# Patient Record
Sex: FEMALE | Race: OTHER
[De-identification: names, ages, dates, MRNs, and addresses within clinical notes are randomized per-mention and may not be internally consistent; named-entity substitution may affect disease eponyms.]

---

## 2017-05-03 NOTE — RAD
HISTORY: Right foot pain



COMPARISONS: None



VIEWS: 3, Frontal, lateral, and oblique views of the right foot



FINDINGS:



BONE DENSITY: Normal.

BONES: There is no displaced fracture. There is a calcaneal enthesophyte.

JOINTS: There is mild osteophytosis of the first MTP joint    

ALIGNMENT: There is no dislocation. 

SOFT TISSUES: Unremarkable.



OTHER FINDINGS: None.



IMPRESSION: 

MILD DEGENERATIVE CHANGES. NO ACUTE OSSEOUS INJURY. IF SYMPTOMS PERSIST, RECOMMEND REPEAT

IMAGING.

## 2017-05-03 NOTE — UC
Alice ACOSTA Auryana, scribed for Vamsi Faith MD on 05/03/17 at 1203 .





Lower Extremity/Ankle HPI





- HPI Summary


HPI Summary: 





51 year old females presents with right foot pain. She reports that she fell 

down this stairs at 09:30 this morning. She states that the pain radiates up 

the right calf and mildly swollen. Ambulation makes the pain worse and states 

there was no improvement with elevation. She denies any medication PTA. PMHx is 

significant for osteoporosis 








- History of Current Complaint


Chief Complaint: UCLowerExtremity


Stated Complaint: FELL-RIGHT FOOT


Time Seen by Provider: 05/03/17 11:47


Hx Obtained From: Patient


Hx Last Menstrual Period: menapause


Pregnant?: No


Onset/Duration: Sudden Onset, Lasting Hours - stating this morning at 09:30, 

Still Present


Severity Initially: Mild


Severity Currently: Mild


Aggravating Factor(s): Ambulation


Alleviating Factor(s): Nothing


Able to Bear Weight: No - limping and must have help


Related History: Other - fell down stairs





- Allergies/Home Medications


Allergies/Adverse Reactions: 


 Allergies











Allergy/AdvReac Type Severity Reaction Status Date / Time


 


Molds & Smuts Allergy Severe CAN'T Verified 07/20/16 15:07





   BREATHE,  





   HIVES  


 


Penicillins Allergy Severe Shortness Verified 07/20/16 15:07





   of Breath  


 


Aspirin Allergy Intermediate Nausea And Verified 07/20/16 15:07





   Vomiting  


 


Naproxen [From Aleve] Allergy Mild Vomiting Verified 07/20/16 15:07


 


CHIPOTLE Allergy Severe "SICK", Uncoded 07/20/16 15:07





   NAUSEA  


 


CLAMS Allergy Severe HIVES, Uncoded 07/20/16 15:07





   "MAKES ME  





   SICK"  


 


ENVIRONMENTAL Allergy Unknown Unknown Uncoded 07/20/16 15:07





   Reaction  





   Details  


 


MICE Allergy Unknown Unknown Uncoded 07/20/16 15:08





   Reaction  





   Details  














PMH/Surg Hx/FS Hx/Imm Hx


Endocrine History Of: 


   Denies: Diabetes, Thyroid Disease


Cardiovascular History Of: Reports: Cardiac Disorders - MURMUR


   Denies: Hypertension


Respiratory History Of: Reports: Asthma - uses nebulizer, Bronchitis


   Denies: COPD


GI/ History Of: 


   Denies: Ulcer


Cancer History Of: 


   Denies: Breast Cancer





- Surgical History


Surgical History: Yes


Surgery Procedure, Year, and Place: tubal ligation.  cholecystectomy





- Family History


Known Family History: Positive: Hypertension, Diabetes, Other - cervical cancer





- Social History


Occupation: Disabled


Lives: With Family


Alcohol Use: None


Substance Use Type: None


Smoking Status (MU): Heavy Every Day Tobacco Smoker


Type: Cigarettes


Amount Used/How Often: 10-12 cigs daily


Length of Time of Smoking/Using Tobacco: 20+ years


Have You Smoked in the Last Year: Yes





Review of Systems


Constitutional: Negative


Skin: Negative


Eyes: Negative


ENT: Negative


Respiratory: Negative


Cardiovascular: Negative


Gastrointestinal: Negative


Genitourinary: Negative


Motor: Negative


Neurovascular: Negative


Musculoskeletal: Edema - at the first MCP on the right foot, Other: - pain at 

the first MCP on the right foot


Neurological: Negative


Psychological: Negative


All Other Systems Reviewed And Are Negative: Yes





Physical Exam


Triage Information Reviewed: Yes


Appearance: Well-Nourished, Pain Distress - mild


Vital Signs: 


 Initial Vital Signs











Temp  98.4 F   05/03/17 11:42


 


Pulse  65   05/03/17 11:42


 


Resp  18   05/03/17 11:42


 


BP  117/71   05/03/17 11:42


 


Pulse Ox  98   05/03/17 11:42











Vital Signs Reviewed: Yes


Eyes: Positive: Conjunctiva Clear


Neck: Positive: Supple


Respiratory: Positive: Lungs clear


Cardiovascular: Positive: RRR


Bowel Sounds: Positive: Present


Musculoskeletal: Positive: Edema @ - at site of injury, Other: - tenderness at 

the right first MCP of the foot


Neurological: Positive: Alert


Psychological: Positive: Age Appropriate Behavior


Skin Exam: Normal, Other - no break in the skin





Diagnostics





- Radiology


  ** R FOOT XR


Xray Interpretation: No Acute Changes - IMPRESSION:  MILD DEGENERATIVE CHANGES. 

NO ACUTE OSSEOUS INJURY. IF SYMPTOMS PERSIST, RECOMMEND REPEAT


Radiology Interpretation Completed By: Radiologist





Lower Extremity Course/Dx





- Course


Course Of Treatment: HOME WITH CAM WALKER





- Differential Dx/Diagnosis


Provider Diagnoses: RT FOOT SPRAIN





Discharge





- Discharge Plan


Condition: Stable


Disposition: HOME


Patient Education Materials:  Foot Sprain (ED)


Referrals: 


Leela Lang MD [Primary Care Provider] - 


Additional Instructions: 


FOLLOW UP WITH YOUR DOCTOR.


GO THE EMERGENCY DEPARTMENT WITH ANY WORSENING OF YOUR CONDITION OR QUESTIONS 

OR CONCERNS.





The documentation as recorded by the Alice waldrop Auryana accurately 

reflects the service I personally performed and the decisions made by me, 

Vamsi Faith MD.

## 2017-08-05 ENCOUNTER — HOSPITAL ENCOUNTER (EMERGENCY)
Dept: HOSPITAL 25 - ED | Age: 52
Discharge: HOME | End: 2017-08-05
Payer: MEDICARE

## 2017-08-05 VITALS — SYSTOLIC BLOOD PRESSURE: 120 MMHG | DIASTOLIC BLOOD PRESSURE: 67 MMHG

## 2017-08-05 DIAGNOSIS — J45.909: ICD-10-CM

## 2017-08-05 DIAGNOSIS — Z88.0: ICD-10-CM

## 2017-08-05 DIAGNOSIS — F17.210: ICD-10-CM

## 2017-08-05 DIAGNOSIS — Z90.49: ICD-10-CM

## 2017-08-05 DIAGNOSIS — Z88.6: ICD-10-CM

## 2017-08-05 DIAGNOSIS — M19.072: Primary | ICD-10-CM

## 2017-08-05 DIAGNOSIS — M81.0: ICD-10-CM

## 2017-08-05 PROCEDURE — 96372 THER/PROPH/DIAG INJ SC/IM: CPT

## 2017-08-05 PROCEDURE — 99282 EMERGENCY DEPT VISIT SF MDM: CPT

## 2017-08-05 NOTE — RAD
Indication: LEFT ankle pain and swelling without proceeding injury. Attention medial

aspect.



Comparison: No relevant prior exams available on the [facility] PACS for comparison.



Technique: AP, mortise, and lateral views LEFT ankle.



Report: Negative for fracture or malalignment. Mild osteophytosis at the talocrural joint.

Potential 3 mm loose body at the posterior joint recess. Suggestion of small talocrural

joint effusion at the posterior recess. Small plantar fascia origin and Achilles tendon

insertion bone spurs. Mild nonfocal soft tissue swelling.



IMPRESSION:  Mild talocrural joint osteoarthritis with potential small loose body at the

posterior joint recess.

## 2017-08-05 NOTE — ED
Lower Extremity





- HPI Summary


HPI Summary: 





Patient presents with left ankle pain without injury.  She notes to swelling x 

2 weeks which dissipated yesterday, but continues with 8/10 constant pain not 

worse with palpation.  Better with rest, worse with ambulating.  Pain is 

located diffusely throughout the ankle, but worse medially and anteriorly over 

the dorsum of the foot.  She also notes to right shoulder pain x 2 days and 

this morning felt a "pop" while stretching.  She has no limit to her ROM and 

pain does not radiate.  PMHx includes arthritis and is followed by her PCP.  

Good pulses +2 bilaterally.  Good capillary refill <2 sec. Denies other 

illness. Thorough physical exam was performed, focusing on ankle special tests 

including foot exam.  Pain on palpation over medial aspect and superior aspect 

with involvement of the deltoid ligaments. No pain on palpation over lateral 

side. Anterior drawer test negative, talar tilt test negative. Vasquez test 

negative, although performed in supine position. Limited ROM. Dorsiflexion, 

great toe extension and plantar flexion intact. No pain on palpation over 

medial or lateral lower extremity.  No pain in the calf and patient denies 

recent surgery or travel. No pain with knee flexion. Pulses intact bilaterally.

  No temperature change or pallor noted bilaterally.  No ecchymosis and 

swelling noted on lateral aspect.  No lesion or disruption of skin is seen. 

Able to bear weight. Shoulder exams performed with no acute findings. Will 

defer at this time for xray and patient agrees. 





- History of Current Complaint


Chief Complaint: EDExtremityLower


Stated Complaint: LEFT ANKLE PAIN, RIGHT SHOULDER INJURY


Time Seen by Provider: 08/05/17 09:52


Hx Obtained From: Patient


Hx Last Menstrual Period: menapause


Mechanism Of Injury: Unknown


Onset of Pain: Days


Onset/Duration: Weeks


Severity Initially: Moderate


Severity Currently: Moderate


Pain Intensity: 4


Pain Scale Used: 0-10 Numeric


Timing: Constant


Location: Is Discrete @ - left ankle medially


Associated Signs And Symptoms: Positive: Negative


Aggravating Factor(s): Standing, Ambulation


Alleviating Factor(s): Rest





- Allergies/Home Medications


Allergies/Adverse Reactions: 


 Allergies











Allergy/AdvReac Type Severity Reaction Status Date / Time


 


Molds & Smuts Allergy Severe CAN'T Verified 07/20/16 15:07





   BREATHE,  





   HIVES  


 


Penicillins Allergy Severe Shortness Verified 07/20/16 15:07





   of Breath  


 


Aspirin Allergy Intermediate Nausea And Verified 07/20/16 15:07





   Vomiting  


 


Naproxen [From Aleve] Allergy Mild Vomiting Verified 07/20/16 15:07


 


CHIPOTLE Allergy Severe "SICK", Uncoded 07/20/16 15:07





   NAUSEA  


 


CLAMS Allergy Severe HIVES, Uncoded 07/20/16 15:07





   "MAKES ME  





   SICK"  


 


ENVIRONMENTAL Allergy Unknown Unknown Uncoded 07/20/16 15:07





   Reaction  





   Details  


 


MICE Allergy Unknown Unknown Uncoded 07/20/16 15:08





   Reaction  





   Details  














PMH/Surg Hx/FS Hx/Imm Hx


Previously Healthy: Yes


Endocrine/Hematology History: 


   Denies: Hx Diabetes, Hx Thyroid Disease


Cardiovascular History: 


   Denies: Hx Hypertension


Respiratory History: Reports: Hx Asthma - uses nebulizer


   Denies: Hx Chronic Obstructive Pulmonary Disease (COPD) - never been 

diagnosed


GI History: 


   Denies: Hx Ulcer


Musculoskeletal History: Reports: Hx Osteoporosis





- Cancer History


Cancer Type, Location and Year: denies


Hx Chemotherapy: No


Hx Radiation Therapy: No





- Surgical History


Surgery Procedure, Year, and Place: tubal ligation.  cholecystectomy





- Immunization History


Date of Tetanus Vaccine: unknown


Date of Influenza Vaccine: UTD


Hx Pertussis Vaccination: No


Immunizations Up to Date: Unable to Obtain/Confirm


Infectious Disease History: No


Infectious Disease History: 


   Denies: Hx Clostridium Difficile, Hx Hepatitis, Hx Human Immunodeficiency 

Virus (HIV), Hx of Known/Suspected MRSA, Hx Shingles, Hx Tuberculosis, Hx Known/

Suspected VRE, Hx Known/Suspected VRSA, History Other Infectious Disease, 

Traveled Outside the [place] in Last 30 Days





- Family History


Known Family History: Positive: Hypertension, Diabetes, Other - cervical cancer





- Social History


Occupation: Unemployed, Employed Full-time


Lives: With Family


Alcohol Use: None


Hx Substance Use: No


Substance Use Type: Reports: None


Hx Tobacco Use: Yes


Smoking Status (MU): Heavy Every Day Tobacco Smoker


Type: Cigarettes


Amount Used/How Often: 10-12 cigs daily


Length of Time of Smoking/Using Tobacco: 20+ years


Have You Smoked in the Last Year: Yes





Review of Systems


Constitutional: Negative


Eyes: Negative


Positive: Palpitations


Respiratory: Negative


Positive: no symptoms reported, see HPI


Positive: Arthralgia, Myalgia


Skin: Negative


Neurological: Negative


All Other Systems Reviewed And Are Negative: Yes





Physical Exam


Triage Information Reviewed: Yes


Vital Signs On Initial Exam: 


 Initial Vitals











Temp Pulse Resp BP Pulse Ox


 


 97.3 F   71   17   120/67   98 


 


 08/05/17 09:46  08/05/17 09:46  08/05/17 09:46  08/05/17 09:46  08/05/17 09:46











Vital Signs Reviewed: Yes


Appearance: Positive: Well-Appearing, Well-Nourished


Skin: Positive: Warm, Skin Color Reflects Adequate Perfusion


Neck: Positive: Supple, No Lymphadenopathy


Respiratory/Lung Sounds: Positive: Clear to Auscultation, Breath Sounds Present


Musculoskeletal: Positive: Normal - see HPI


Neurological: Positive: Sensory/Motor Intact, Alert, Oriented to Person Place, 

Time, Speech Normal


Psychiatric: Positive: Normal





Diagnostics





- Vital Signs


 Vital Signs











  Temp Pulse Resp BP Pulse Ox


 


 08/05/17 10:10  97.3 F  71  20  120/67  98


 


 08/05/17 09:46  97.3 F  71  17  120/67  98














- Laboratory


Lab Statement: Any lab studies that have been ordered have been reviewed, and 

results considered in the medical decision making process.





Lower Extremity Course/Dx





- Course


Course Of Treatment: Based on [place] [facility]le Rules, patient sent to imaging.  

Xray negative for fracture or other acute findings. Medial and lateral distal 

lower extremity without pain and x-rays show no widening of the ankle joint 

regarding low suspicion for Maisonneuve fx.  Ankle was ace wrapped to patient 

comfort to allow for immobilization for this period of time. Patient given 

orthopedic follow up in 5-7 days.  Encouraged Ibuprofen 600mg three times daily 

with meals for pain.  Return precautions given.  Educated patient regarding 

ankle injuries and healing time and the possibility of further evaluation and 

imaging as orthopedist sees fit.





- Diagnoses


Differential Diagnosis/HQI/PQRI: Positive: Fracture (Closed), Sprain, Strain


Provider Diagnoses: 


 Arthritis








Discharge





- Discharge Plan


Condition: Stable


Disposition: HOME


Prescriptions: 


Ketorolac TAB * [Toradol TAB *] 10 mg PO Q6H #16 tab


Patient Education Materials:  Osteoarthritis (ED)


Referrals: 


Leela Lang MD [Primary Care Provider] - 


Additional Instructions: 





Toradol x 4 days


Do not take ibuprofen with this medication


Ibuprofen 600mg three times daily with meals for pain.  


Follow up with orthopedic physician in 5-7 days. 


If numbness, tingling, decreased sensation, increased pain, temperature changes 

or pallor noted in toes, come back to ER immediately. 


Ice.  Not directly on the skin. Cover with a towel. Apply ice no more than 30 

minutes at a time 


Compression:  You may use and keep an ace wrap bandage over the injury to 

decrease swelling. Again, this should be limited and be taken off periodically 

to encourage early range of motion and mobilization.


Elevate: Try to elevate the injured area above the heart whenever possible.

## 2018-02-11 ENCOUNTER — HOSPITAL ENCOUNTER (EMERGENCY)
Dept: HOSPITAL 25 - ED | Age: 53
LOS: 1 days | Discharge: HOME | End: 2018-02-12
Payer: MEDICARE

## 2018-02-11 DIAGNOSIS — F17.210: ICD-10-CM

## 2018-02-11 DIAGNOSIS — M54.5: Primary | ICD-10-CM

## 2018-02-11 PROCEDURE — 96372 THER/PROPH/DIAG INJ SC/IM: CPT

## 2018-02-11 PROCEDURE — 99282 EMERGENCY DEPT VISIT SF MDM: CPT

## 2018-02-11 PROCEDURE — 72131 CT LUMBAR SPINE W/O DYE: CPT

## 2018-02-12 VITALS — SYSTOLIC BLOOD PRESSURE: 118 MMHG | DIASTOLIC BLOOD PRESSURE: 60 MMHG

## 2018-02-12 NOTE — RAD
HISTORY: Right-sided back pain, right flank pain



COMPARISONS: None



TECHNIQUE: Multiple contiguous axial CT scans were obtained of the lumbar spine without 

intravenous contrast, with coronal and sagittal multiplanar reformations.     



FINDINGS:



SPINAL CANAL: Evaluation of the central canal is limited on CT technique; however, there

is no obvious canalicular mass or epidural hemorrhage.

ALIGNMENT: There is a mild levoscoliotic curvature of the spine.

VERTEBRAL BODIES: The vertebral bodies are preserved in height. The bones are normal in

attenuation.

JOINTS: There is mild facet hypertrophic change along the lower lumbar spine

MUSCULATURE: Normal

INTERVERTEBRAL DISCS: There is mild diffuse loss of intervertebral disc height throughout

the spine.



AXIAL IMAGES:

T12-L1: There is no osseous neural foraminal narrowing or central canal stenosis.

L1-L2: There is no osseous neural foraminal narrowing or central canal stenosis.

L2-L3: There is no osseous neural foraminal narrowing or central canal stenosis.

L3-L4: There is a mild broad-based disc bulge. There is no osseous neural foraminal

narrowing or central canal stenosis.

L4-L5: There is a broad-based disc bulge. There is no osseous neural foraminal narrowing

or central canal stenosis.

L5-S1: There is a mild broad-based disc bulge. There is no osseous neural foraminal

narrowing or central canal stenosis.



SOFT TISSUES: There is mild calcific atherosclerosis of the abdominal aorta.



OTHER: None



IMPRESSION:

MILD DEGENERATIVE DISC DISEASE AND OSTEOARTHRITIS, WITHOUT OSSEOUS NEURAL FORAMINAL AREA

OR CENTRAL CANAL STENOSIS.

## 2018-05-24 ENCOUNTER — HOSPITAL ENCOUNTER (EMERGENCY)
Dept: HOSPITAL 25 - UCEAST | Age: 53
Discharge: HOME | End: 2018-05-24
Payer: MEDICARE

## 2018-05-24 VITALS — SYSTOLIC BLOOD PRESSURE: 127 MMHG | DIASTOLIC BLOOD PRESSURE: 59 MMHG

## 2018-05-24 DIAGNOSIS — Z88.0: ICD-10-CM

## 2018-05-24 DIAGNOSIS — S99.921A: Primary | ICD-10-CM

## 2018-05-24 DIAGNOSIS — Z91.048: ICD-10-CM

## 2018-05-24 DIAGNOSIS — F17.210: ICD-10-CM
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
## 2018-05-24 DIAGNOSIS — Z88.6: ICD-10-CM

## 2018-05-24 PROCEDURE — G0463 HOSPITAL OUTPT CLINIC VISIT: HCPCS

## 2018-05-24 PROCEDURE — 99212 OFFICE O/P EST SF 10 MIN: CPT

## 2018-05-24 NOTE — RAD
HISTORY: Right foot great toe pain, trauma



COMPARISONS: May 03, 2017



VIEWS: 6, Frontal, lateral, and oblique views of the right foot and great toe



FINDINGS:



BONE DENSITY: Normal.

BONES: There is no displaced fracture. There are calcaneal enthesophytes.

JOINTS: There is mild osteoarthritis of the first MTP joint.    

ALIGNMENT: There is no dislocation. 

SOFT TISSUES: Unremarkable.



OTHER FINDINGS: None.



IMPRESSION: 

MILD OSTEOARTHRITIS. NO ACUTE OSSEOUS INJURY. IF SYMPTOMS PERSIST, RECOMMEND REPEAT

IMAGING.

## 2018-05-24 NOTE — UC
Lower Extremity/Ankle HPI





- HPI Summary


HPI Summary: 





53 yo WF c/o right great toe and foot pain after dropping a VCR player on her 

right foot yesterday and now swollen and PAINFUL





- History of Current Complaint


Chief Complaint: UCLowerExtremity


Stated Complaint: FOOT INJURY


Time Seen by Provider: 05/24/18 07:49


Hx Obtained From: Patient


Hx Last Menstrual Period: menapause


Severity Initially: Moderate


Severity Currently: Moderate


Pain Intensity: 4


Pain Scale Used: 0-10 Numeric


Aggravating Factor(s): Standing


Able to Bear Weight: Yes





- Allergies/Home Medications


Allergies/Adverse Reactions: 


 Allergies











Allergy/AdvReac Type Severity Reaction Status Date / Time


 


clams Allergy  Hives Verified 05/24/18 07:22


 


mold Allergy  Anaphylatic Verified 05/24/18 07:22





   Shock  


 


Penicillins Allergy  Shortness Verified 05/24/18 07:22





   of Breath  


 


NSAIDS (Non-Steroidal AdvReac  Nausea And Verified 05/24/18 07:22





Anti-Inflamma   Vomiting  


 


ENVIRONMENTAL Allergy Unknown Unknown Uncoded 05/24/18 07:22





   Reaction  





   Details  


 


MICE Allergy Unknown Unknown Uncoded 05/24/18 07:22





   Reaction  





   Details  


 


CHIPOTLE AdvReac Severe "SICK", Uncoded 05/24/18 07:22





   NAUSEA  














PMH/Surg Hx/FS Hx/Imm Hx


Previously Healthy: Yes





- Surgical History


Surgical History: None


Surgery Procedure, Year, and Place: tubal ligation.  cholecystectomy





- Family History


Known Family History: Positive: Hypertension, Diabetes, Other - cervical cancer





- Social History


Alcohol Use: None


Substance Use Type: None


Smoking Status (MU): Heavy Every Day Tobacco Smoker


Type: Cigarettes


Amount Used/How Often: 7 cigs daily


Length of Time of Smoking/Using Tobacco: 20+ years


Have You Smoked in the Last Year: Yes





Review of Systems


Constitutional: Negative


Skin: Negative


Eyes: Negative


ENT: Negative


Respiratory: Negative


Cardiovascular: Negative


Gastrointestinal: Negative


Genitourinary: Negative


Motor: Negative


Neurovascular: Negative


Musculoskeletal: Other: - right swollen foot


Neurological: Negative


Psychological: Negative


All Other Systems Reviewed And Are Negative: Yes





Physical Exam


Triage Information Reviewed: Yes


Appearance: Well-Appearing


Vital Signs: 


 Initial Vital Signs











Temp  36.5 C   05/24/18 07:14


 


Pulse  73   05/24/18 07:14


 


Resp  14   05/24/18 07:14


 


BP  127/59   05/24/18 07:14


 


Pulse Ox  99   05/24/18 07:14











Eye Exam: Normal


ENT Exam: Normal


Dental Exam: Normal


Neck exam: Normal


Neck: Positive: 1


Respiratory Exam: Normal


Cardiovascular Exam: Normal


Abdominal Exam: Normal


Musculoskeletal: Positive: Other: - right 1st toe MTPJ tenderness and swelling


Neurological Exam: Normal


Psychological Exam: Normal


Skin Exam: Normal





Lower Extremity Course/Dx





- Course


Course Of Treatment: XR of right great toe and foot neg for fx or dislocation





- Differential Dx/Diagnosis


Provider Diagnoses: right foot injury.  right 1st toe metatarsal injury and pain





Discharge





- Sign-Out/Discharge


Documenting (check all that apply): Discharge/Admit/Transfer





- Discharge Plan


Condition: Stable


Disposition: HOME


Patient Education Materials:  Swollen Joint (ED)


Referrals: 


Leela Lang MD [Primary Care Provider] - 





- Billing Disposition and Condition


Condition: STABLE


Disposition: HOME

## 2018-08-09 ENCOUNTER — HOSPITAL ENCOUNTER (EMERGENCY)
Dept: HOSPITAL 25 - UCEAST | Age: 53
Discharge: HOME | End: 2018-08-09
Payer: MEDICARE

## 2018-08-09 VITALS — DIASTOLIC BLOOD PRESSURE: 68 MMHG | SYSTOLIC BLOOD PRESSURE: 111 MMHG

## 2018-08-09 DIAGNOSIS — M25.511: Primary | ICD-10-CM

## 2018-08-09 DIAGNOSIS — F17.210: ICD-10-CM

## 2018-08-09 DIAGNOSIS — Z88.0: ICD-10-CM

## 2018-08-09 DIAGNOSIS — Z88.6: ICD-10-CM

## 2018-08-09 PROCEDURE — G0463 HOSPITAL OUTPT CLINIC VISIT: HCPCS

## 2018-08-09 PROCEDURE — 99213 OFFICE O/P EST LOW 20 MIN: CPT

## 2018-08-09 NOTE — XMS REPORT
Jelena Fitch

 Created on:2018



Patient:Jelena Fitch

Sex:Female

:1965

External Reference #:[id number]





Demographics







 Address  [address]



   [place] [address]

 

 Home Phone  [phone]

 

 Mobile Phone  [phone]

 

 Email Address  [email or web address]

 

 Preferred Language  English

 

 Marital Status   Or 

 

 Rastafarian Affiliation  Unknown

 

 Race  White

 

 Ethnic Group   Or 









Author







 Organization  [facility]

 

 Address  [address]



   [place] [address]

 

 Phone  [phone]









Care Team Providers







 Name  Role  Phone

 

 Leela Lang MD  Care Team Information   Unavailable

 

 Leela Lang MD  Primary Care Physician  Unavailable









Payers







 Type  Date  Identification Numbers  Payment Provider  Subscriber

 

 Medicare Primary  Effective:  Policy Number:  [place]  Jelena Fitch



   2017  [id number]    









 PayID: [id number]  [address]









 [facility] [place] [address]









 Medigap Part B    Policy Number: [id number]  [place]icaid [place]  Jelena Fitch









 PayID: [id number]  [address]









 [place] [address]









 Health Maintenance  Expires:  Policy Number:  [facility] ([facility])  2017  [id number]  [place]









 PayID: [id number]  [address] [address]









 [place] [address]







Problems







 Date  Description  Provider  Status

 

 Onset: 2017  Allergic rhinitis due to pollen  Manuel Padilla MD  
Active

 

 Onset: 2017  Allergic rhinitis  Manule Padilla MD  Active

 

 Onset: 2017  Uncomplicated moderate persistent  Manuel Padilla MD
  Active



   asthma    

 

 Onset: 2018  Asthma without status asthmaticus  LIDIA Tijerina  Active







Family History







 Date  Family Member(s)  Problem(s)  Comments

 

   General  No Current Problems  







Social History







 Type  Date  Description  Comments

 

 Smoking    Patient has never smoked  







Allergies, Adverse Reactions, Alerts







 Date  Description  Reaction  Status  Severity  Comments

 

 06/10/2015  Cats    active    

 

 06/10/2015  Dust Mite    active    

 

 06/10/2015  House Dust    active    

 

 06/10/2015  Dogs    active    







Medications







 Medication  Date  Status  Form  Strength  Qnty  SIG  Indications  Ordering



                 Provider

 

 Flonase    Active  Suspension  27.5mcg/S  15.80  one spray  J30.89  
Christopher



 Sensimist        pray  0ml  in each    GEE Padilla MD



             nostril    



             daily    

 

 Advair Diskus    Active  Aerosol  500-50mcg  60uni  Inhale One  J30.1  
Demetrio Godinez,



         /Dose  ts  puff By    PA



             Mouth Twice    



             A Day    

 

 Fexofenadine  05/10  Active  Tablets  180mg  30tab  take one    opher



 HCL          s  tablet by    GEE Padilla MD



             mouth every    



             day    

 

 Allergy 24-HR    Active  Tablets  180mg  30tab  1 by mouth            s  every day    GEE Padilla MD

 

 Proventil HFA    Active  Aerosol  108(90Bas  6.700  Inhale Two          e)  gm  Puffs Every    RICK Sotomayor



         mcg/Act    4 Hours as    



             Needed    

 

 Albuterol    Active  Nebulizer  (2.5mg/3M    inhale 3    Unknown



 Sulfate  /0000      L) 0.083%    milliliters    



             (2.5 mg) by    



             nebulizatio    



             n route 4    



             times per    



             day as    



             needed    

 

 Calcium 600-D  00  Active  Tablets  600-400mg    1 by mouth    Unknown



   /0000      -Unit    twice a day    

 

                 

 

 Prednisone  06/15  Hx  Tablets  10mg  18tab  take 3            s  tablets by    GEE Padilla MD



   -          mouth twice    



             a day for 3    



   /2018          days. take    



             with food.    

 

 Dulera    Hx  Aerosol  200-5mcg/  8.800  2 puff  J30.1        Act  gm  twice a day    GEE Padilla MD



   -              



                 

 

 Advair Diskus    Hx  Aerosol  250-50mcg  60uni  Inhale One          /Dose  ts  puff By    RICK Sotomayor



   -          Mouth Twice    



             A Day In    



             The Morning    



             And Evening    



             12 Hours    



             Apart    

 

 Zyrtec  06/10  Hx  Tablet  10mg  30tab  Take One            s  Tablet By    RICK Sotomayor



   -          Mouth Once    



             Daily as    



   /2017          Needed    







Medications Administered in Office







 Medication  Date  Status  Form  Strength  Qnty  SIG  Indications  Ordering



                 Provider

 

 Allergy  /  Administered  Injection          Christopher



 Injection 2                GEE Padilla MD



 Or More                

 

 Allergy  /  Administered  Injection          Christopher



 Injection  2018              GEE Padilla MD



 Single                

 

 Allergy  /  Administered  Injection          Christopher



 Injection 2                GEE Padilla MD



 Or More                

 

 Allergy  /15/  Administered  Injection          Christopher



 Injection 2                GEE Padilla MD



 Or More                

 

 Allergy  //  Administered  Injection          Christopher



 Injection 2                GEE Padilla MD



 Or More                

 

 Allergy  /  Administered  Injection          Christopher



 Injection 2                GEE Padilla MD



 Or More                

 

 Allergy  05/04/  Administered  Injection          Christopher



 Injection 2                GEE Padilla MD



 Or More                

 

                 

 

 Allergy  /  Administered  Injection          Christopher



 Injection 2                GEE Padilla MD



 Or More                

 

 Allergy  04//  Administered  Injection          Christopher



 Injection 2                GEE Padilla MD



 Or More                

 

 Allergy  /  Administered  Injection          Christopher



 Injection 2                GEE Padilla MD



 Or More                

 

 Allergy  /  Administered  Injection          Christopher



 Injection 2                GEE Padilla MD



 Or More                

 

 Allergy  /  Administered  Injection          Christopher



 Injection 2                GEE Padilla MD



 Or More                

 

 Allergy  //  Administered  Injection          Christopher



 Injection 2                GEE Padilla MD



 Or More                

 

 Allergy  /  Administered  Injection          Christopher



 Injection 2                GEE Padilla MD



 Or More                

 

 Allergy  10/25/  Administered  Injection          Christopher



 Injection 2                GEE Padilla MD



 Or More                

 

 Allergy  10//  Administered  Injection          Christopher



 Injection 2                GEE Padilla MD



 Or More                

 

 Allergy  /  Administered  Injection          Christopher



 Injection 2  2017              GEE Padilla MD



 Or More                

 

 Allergy  //  Administered  Injection          Christopher



 Injection 2                GEE Padilla MD



 Or More                

 

 Allergy  /  Administered  Injection          Christopher



 Injection 2                GEE Padilla MD



 Or More                

 

 Allergy  /16/  Administered  Injection          Christopher



 Injection 2                GEE Padilla MD



 Or More                

 

 Allergy  08/02/  Administered  Injection          Christopher



 Injection 2                GEE Padilla MD



 Or More                

 

 Allergy  /  Administered  Injection          Christopher



 Injection 2                GEE Padilla MD



 Or More                

 

 Allergy  //  Administered  Injection          Christopher



 Injection 2                GEE Padilla MD



 Or More                

 

 Allergy  //  Administered  Injection          Christopher



 Injection 2                GEE Padilla MD



 Or More                

 

 Allergy  //  Administered  Injection          Christopher



 Injection 2                GEE Padilla MD



 Or More                

 

 Allergy  //  Administered  Injection          Christopher



 Injection 2                GEE Padilla MD



 Or More                

 

 Allergy  /  Administered  Injection          Christopher



 Injection 2                GEE Padilla MD



 Or More                

 

 Allergy  04/03/  Administered  Injection          Christopher



 Injection 2                GEE Padilla MD



 Or More                

 

 Allergy  /  Administered  Injection          Christopher



 Injection 2                GEE Padilla MD



 Or More                

 

 Allergy  //  Administered  Injection          Christopher



 Injection 2                GEE Padilla MD



 Or More                

 

 Allergy  /  Administered  Injection          Christopher



 Injection 2                GEE Padilla MD



 Or More                

 

 Allergy  //  Administered  Injection          Christopher



 Injection 2                GEE Padilla MD



 Or More                

 

 Allergy  /  Administered  Injection          Christopher



 Injection 2                GEE Padilla MD



 Or More                

 

 Allergy  //  Administered  Injection          Christopher



 Injection 2                GEE Padilla MD



 Or More                

 

 Allergy  /  Administered  Injection          Christopher



 Injection 2                GEE Padilla MD



 Or More                

 

 Allergy  /  Administered  Injection          Christopher



 Injection 2                GEE Padilla MD



 Or More                

 

 Allergy  /  Administered  Injection          Christopher



 Injection 2                GEE Padilla MD



 Or More                

 

 Allergy  /  Administered  Injection          Christopher



 Injection 2                GEE Padilla MD



 Or More                

 

 Allergy  10/28/  Administered  Injection          Christopher



 Injection 2                GEE Padilla MD



 Or More                

 

 Allergy  10/14/  Administered  Injection          Christopher



 Injection 2                GEE Padilla MD



 Or More                

 

 Allergy  /  Administered  Injection          Christopher



 Injection 2                GEE Padilla MD



 Or More                

 

 Allergy  /  Administered  Injection          Christopher



 Injection 2                GEE Padilla MD



 Or More                

 

 Allergy  //  Administered  Injection          Christopher



 Injection 2                GEE Padilla MD



 Or More                

 

 Allergy  08//  Administered  Injection          Christopher



 Injection 2                GEE Padilla MD



 Or More                

 

 Allergy  /  Administered  Injection          Christopher



 Injection 2                GEE Padilla MD



 Or More                

 

 Allergy  07//  Administered  Injection          Christopher



 Injection 2                GEE Padilla MD



 Or More                

 

 Allergy  //  Administered  Injection          Christopher



 Injection 2                GEE Padilla MD



 Or More                

 

 Allergy  06//  Administered  Injection          Christopher



 Injection 2                GEE Padilla MD



 Or More                

 

 Allergy  /25/  Administered  Injection          Christopher



 Injection 2                GEE Padilla MD



 Or More                

 

 Allergy  /18/  Administered  Injection          Christopher



 Injection 2                GEE Padilla MD



 Or More                

 

 Allergy  /18/  Administered  Injection          Christopher



 Injection 2                GEE Padilla MD



 Or More                

 

 Allergy  //  Administered  Injection          Christopher



 Injection 2                GEE Padilla MD



 Or More                

 

 Allergy  /  Administered  Injection          Christopher



 Injection 2                GEE Padilla MD



 Or More                

 

 Allergy  /  Administered  Injection          Christopher



 Injection 2                GEE Padilla MD



 Or More                

 

 Allergy  /  Administered  Injection          Christopher



 Injection 2                GEE Padilla MD



 Or More                

 

 Allergy  /  Administered  Injection          Christopher



 Injection 2                GEE Padilla MD



 Or More                

 

 Allergy  /  Administered  Injection          Christopher



 Injection 2                GEE Padilla MD



 Or More                

 

 Allergy  /  Administered  Injection          Christopher



 Injection 2                GEE Padilla MD



 Or More                

 

 Allergy  /  Administered  Injection          Christopher



 Injection 2                GEE Padilla MD



 Or More                

 

 Allergy  02/15/  Administered  Injection          Christopher



 Injection 2                GEE Padilla MD



 Or More                

 

 Allergy  /  Administered  Injection          Christopher



 Injection 2                GEE Padilla MD



 Or More                

 

 Allergy  /  Administered  Injection          Christopher



 Injection 2                GEE Padilla MD



 Or More                

 

 Allergy  /  Administered  Injection          Christopher



 Injection 2                GEE Padilla MD



 Or More                

 

 Allergy  /  Administered  Injection          Christopher



 Injection 2                GEE Padilla MD



 Or More                

 

 Allergy  /  Administered  Injection          Christopher



 Injection 2                GEE Padilla MD



 Or More                

 

 Allergy  /  Administered  Injection          Christopher



 Injection 2                GEE Padilla MD



 Or More                

 

 Allergy  /  Administered  Injection          Christopher



 Injection 2                GEE Padilla MD



 Or More                

 

 Allergy  /  Administered  Injection          Christopher



 Injection 2                GEE Padilla MD



 Or More                

 

 Allergy  /  Administered  Injection          Christopher



 Injection 2                GEE Padilla MD



 Or More                

 

 Allergy  /  Administered  Injection          Christopher



 Injection 2                GEE Padilla MD



 Or More                

 

 Allergy  /  Administered  Injection          Christopher



 Injection 2                GEE Padilla MD



 Or More                

 

 Allergy  /  Administered  Injection          Christopher



 Injection 2                GEE Padilla MD



 Or More                







Vital Signs







 Date  Vital  Result  Comment

 

 2018  BP Systolic  122 mmHg  









 BP Diastolic  64 mmHg  

 

 Height  61 inches  5'1"

 

 Weight  177.00 lb  

 

 BMI (Body Mass Index)  33.4 kg/m2  

 

 Heart Rate  68 /min  

 

 Respiratory Rate  18 /min  

 

 Body Temperature  97.6 F  

 

 O2 % BldC Oximetry  96 %  









 2017  BP Systolic  132 mmHg  









 BP Diastolic  76 mmHg  

 

 Height  61 inches  5'1"

 

 Weight  190.00 lb  

 

 BMI (Body Mass Index)  35.9 kg/m2  

 

 Heart Rate  83 /min  

 

 Respiratory Rate  16 /min  

 

 Body Temperature  94.5 F  taken x 2

 

 O2 % BldC Oximetry  98 %  









 06/10/2015  BP Systolic  117 mmHg  









 BP Diastolic  77 mmHg  

 

 Height  61 inches  

 

 Weight  175.00 lb  

 

 Heart Rate  82 /min  







Results







 Test  Date  Test  Result  H/L  Range  Note

 

 Order  2018  Nitric Oxide  <pending>      









 PFT Supplies  <pending>      

 

 PFT With Bronchodilator  <pending>      







Procedures







 Date  CPT Code  Description  Status

 

 2018  86960  Allergy Injection 2 Or More  Completed

 

 2018  95835  Nitric Oxide  Gas Determination  Completed

 

 2018  20399  Bronchodilation Responsiveness Spirometry Pre/Post  
Completed



     Bronchodil Adm  

 

 2018  96594  Allergy Antigens Single Or Multiple  Completed

 

 2018  05046  Allergy Injection Single  Completed

 

 2018  27575  Allergy Injection 2 Or More  Completed

 

 06/15/2018  65873  Allergy Injection 2 Or More  Completed

 

 2018  15521  Allergy Injection 2 Or More  Completed

 

 2018  31350  Allergy Injection 2 Or More  Completed

 

 2018  53322  Allergy Injection 2 Or More  Completed

 

 2018  24810  Allergy Injection 2 Or More  Completed

 

 2018  69317  Allergy Injection 2 Or More  Completed

 

 2018  38560  Allergy Injection 2 Or More  Completed

 

 2018  12478  Allergy Injection 2 Or More  Completed

 

 2018  55376  Allergy Injection 2 Or More  Completed

 

 2017  03739  Allergy Injection 2 Or More  Completed

 

 2017  42586  Allergy Antigens Single Or Multiple  Completed

 

 2017  37794  Allergy Injection 2 Or More  Completed

 

 10/25/2017  25968  Allergy Injection 2 Or More  Completed

 

 10/13/2017  88304  Allergy Injection 2 Or More  Completed

 

 2017  95153  Allergy Injection 2 Or More  Completed

 

 2017  48637  Allergy Injection 2 Or More  Completed

 

 2017  30362  Allergy Injection 2 Or More  Completed

 

 2017  83018  Allergy Injection 2 Or More  Completed

 

 2017  71353  Allergy Injection 2 Or More  Completed

 

 2017  33912  Allergy Injection 2 Or More  Completed

 

 2017  78790  Allergy Injection 2 Or More  Completed

 

 2017  82229  Allergy Antigens Single Or Multiple  Completed

 

 2017  86484  Allergy Injection 2 Or More  Completed

 

 2017  35277  Allergy Injection 2 Or More  Completed

 

 2017  84110  Allergy Injection 2 Or More  Completed

 

 2017  71190  Allergy Injection 2 Or More  Completed

 

 2017  37948  Allergy Injection 2 Or More  Completed

 

 2017  63093  Allergy Injection 2 Or More  Completed

 

 2017  21487  Allergy Injection 2 Or More  Completed

 

 2017  06191  Nitric Oxide  Gas Determination  Completed

 

 2017  08466  Bronchodilation Responsiveness Spirometry Pre/Post  
Completed



     Bronchodil Adm  

 

 2017  13783  Allergy Injection 2 Or More  Completed

 

 2017  35265  Allergy Injection 2 Or More  Completed

 

 2017  35321  Allergy Injection 2 Or More  Completed

 

 2017  98223  Allergy Injection 2 Or More  Completed

 

 2016  11419  Allergy Antigens Single Or Multiple  Completed

 

 2016  77933  Allergy Injection 2 Or More  Completed

 

 2016  07194  Allergy Injection 2 Or More  Completed

 

 2016  94768  Allergy Injection 2 Or More  Completed

 

 2016  77233  Allergy Injection 2 Or More  Completed

 

 10/28/2016  58520  Allergy Injection 2 Or More  Completed

 

 10/14/2016  30486  Allergy Injection 2 Or More  Completed

 

 2016  42410  Allergy Injection 2 Or More  Completed

 

 2016  09433  Allergy Injection 2 Or More  Completed

 

 2016  59853  Allergy Injection 2 Or More  Completed

 

 2016  36291  Allergy Injection 2 Or More  Completed

 

 2016  12293  Allergy Injection 2 Or More  Completed

 

 2016  64965  Allergy Injection 2 Or More  Completed

 

 2016  70393  Allergy Antigens Single Or Multiple  Completed

 

 2016  48374  Allergy Injection 2 Or More  Completed

 

 2016  11002  Allergy Injection 2 Or More  Completed

 

 2016  26793  Allergy Injection 2 Or More  Completed

 

 2016  43218  Allergy Injection 2 Or More  Completed

 

 2016  75170  Allergy Injection 2 Or More  Completed

 

 2016  68224  Allergy Injection 2 Or More  Completed

 

 2016  67203  Allergy Injection 2 Or More  Completed

 

 2016  35653  Allergy Injection 2 Or More  Completed

 

 2016  04462  Allergy Injection 2 Or More  Completed

 

 2016  94765  Allergy Injection 2 Or More  Completed

 

 2016  10619  Allergy Injection 2 Or More  Completed

 

 2016  11612  Allergy Injection 2 Or More  Completed

 

 2016  64189  Allergy Injection 2 Or More  Completed

 

 02/15/2016  20960  Allergy Injection 2 Or More  Completed

 

 2016  34587  Allergy Injection 2 Or More  Completed

 

 2016  89361  Allergy Injection 2 Or More  Completed

 

 2016  25633  Allergy Injection 2 Or More  Completed

 

 2016  05920  Allergy Injection 2 Or More  Completed

 

 2015  70844  Allergy Injection 2 Or More  Completed

 

 2015  43038  Allergy Injection 2 Or More  Completed

 

 2015  51016  Allergy Injection 2 Or More  Completed

 

 2015  31536  Allergy Injection 2 Or More  Completed

 

 2015  41950  Allergy Injection 2 Or More  Completed

 

 2015  31812  Allergy Injection 2 Or More  Completed

 

 2015  28657  Allergy Injection 2 Or More  Completed

 

 2015  48183  Allergy Injection 2 Or More  Completed







Encounters







 Type  Date  Location  Provider  CPT E/M  Dx

 

 Office Visit  2018  9:00a  LIDIA Noble  22221  J30.89









 J30.1

 

 J45.909









 Office Visit  2017  9:45a  Suhas Padilla MD  95461  J30.1









 J30.89

 

 J45.40







Plan of Care

Future Appointment(s):08/10/2018  8:45 am - Injection 1 at [id number]  8:
30 am - LIDIA Tijerina at [facility]

## 2018-08-09 NOTE — XMS REPORT
Jelena Fitch

 Created on:2018



Patient:Jelena Fitch

Sex:Female

:1965

External Reference #:[id number]





Demographics







 Address  [address]



   [place] [address]

 

 Home Phone  [phone]

 

 Mobile Phone  [phone]

 

 Email Address  [email or web address]

 

 Preferred Language  English

 

 Marital Status   Or 

 

 Pentecostal Affiliation  Unknown

 

 Race  White

 

 Ethnic Group   Or 









Author







 Organization  [facility]

 

 Address  [address]



   [place] [address]

 

 Phone  [phone]









Care Team Providers







 Name  Role  Phone

 

 Leela Lang MD  Care Team Information   Unavailable

 

 Leela Lang MD  Primary Care Physician  Unavailable









Payers







 Type  Date  Identification Numbers  Payment Provider  Subscriber

 

 Medicare Primary  Effective:  Policy Number:  [place]  Jelena Fitch



   2017  [id number]    









 PayID: [id number]  [address]









 [facility] [place] [address]









 Medigap Part B    Policy Number: [id number]  [place]icaid [place]  Jelena Fitch









 PayID: [id number]  [address]









 [place] [address]









 Health Maintenance  Expires:  Policy Number:  [facility] (O[facility]  2017  [id number]  [place]









 PayID: [id number]  [address] [address]









 [place] [address]







Problems







 Date  Description  Provider  Status

 

 Onset: 2017  Allergic rhinitis due to pollen  Manuel Padilla MD  
Active

 

 Onset: 2017  Allergic rhinitis  Manuel Padilla MD  Active

 

 Onset: 2017  Uncomplicated moderate persistent  Manuel Padilla MD
  Active



   asthma    







Family History







 Date  Family Member(s)  Problem(s)  Comments

 

   General  No Current Problems  







Social History







 Type  Date  Description  Comments

 

 Smoking    Patient has never smoked  







Allergies, Adverse Reactions, Alerts







 Date  Description  Reaction  Status  Severity  Comments

 

 06/10/2015  Cats    active    

 

 06/10/2015  Dust Mite    active    

 

 06/10/2015  House Dust    active    

 

 06/10/2015  Dogs    active    







Medications







 Medication  Date  Status  Form  Strength  Qnty  SIG  Indications  Ordering



                 Provider

 

 Advair Diskus    Active  Aerosol  500-50mcg  60uni  Inhale One  J30.1  
Demetrio Godinez,



         /Dose  ts  puff By    PA



             Mouth Twice    



             A Day    

 

 Fexofenadine  05/10  Active  Tablets  180mg  30tab  Take One    Christopher



 HCL  /2017        s  Tablet By    GEE Padilla MD



             Mouth Every    



             Day    

 

 Allergy 24-HR    Active  Tablets  180mg  30tab  1 by mouth            s  every day    GEE Padilla MD

 

 Proventil HFA    Active  Aerosol  108(90Bas  6.700  Inhale Two          e)  gm  Puffs Every    RICK Sotomayor



         mcg/Act    4 Hours as    



             Needed    

 

 Albuterol  00  Active  Nebulizer  (2.5mg/3M    inhale 3    Unknown



 Sulfate  /0000      L) 0.083%    milliliters    



             (2.5 mg) by    



             nebulization    



             route 4    



             times per    



             day as    



             needed    

 

 Calcium 600-D    Active  Tablets  600-400mg    1 by mouth    Unknown



   /0000      -Unit    twice a day    

 

                 

 

 Prednisone  06/15  Hx  Tablets  10mg  18tab  take 3            s  tablets by    GEE Padilla MD



   -          mouth twice    



             a day for 3    



   /2018          days. take    



             with food.    

 

 Dulera    Hx  Aerosol  200-5mcg/  8.800  2 puff twice  J30.1        Act  gm  a day    GEE Padilla MD



   -              



                 

 

 Advair Diskus    Hx  Aerosol  250-50mcg  60uni  Inhale One          /Dose  ts  puff By    RICK Sotomayor



   -          Mouth Twice    



             A Day In The    



             Morning And    



             Evening 12    



             Hours Apart    

 

 Zyrtec  06/10  Hx  Tablet  10mg  30tab  Take One            s  Tablet By    RICK Sotomayor



   -          Mouth Once    



             Daily as    



   /2017          Needed    







Medications Administered in Office







 Medication  Date  Status  Form  Strength  Qnty  SIG  Indications  Ordering



                 Provider

 

 Allergy  /  Administered  Injection          Christopher



 Injection 2  2018              GEE Padilla MD



 Or More                

 

 Allergy  /  Administered  Injection          Christopher



 Injection  2018              GEE Padilla MD



 Single                

 

 Allergy  /  Administered  Injection          Christopher



 Injection 2  2018              GEE Padilla MD



 Or More                

 

 Allergy  06/15/  Administered  Injection          Christopher



 Injection 2  2018              GEE Padilla MD



 Or More                

 

 Allergy  /  Administered  Injection          Christopher



 Injection 2  2018              GEE Padilla MD



 Or More                

 

 Allergy  05/18/  Administered  Injection          Christopher



 Injection 2                GEE Padilla MD



 Or More                

 

                 

 

 Allergy  05/04/  Administered  Injection          Christopher



 Injection 2                GEE Padilla MD



 Or More                

 

 Allergy  /  Administered  Injection          Christopher



 Injection 2                GEE Padilla MD



 Or More                

 

 Allergy  //  Administered  Injection          Christopher



 Injection 2                GEE Padilla MD



 Or More                

 

 Allergy  /  Administered  Injection          Christopher



 Injection 2                GEE Padilla MD



 Or More                

 

 Allergy  /  Administered  Injection          Christopher



 Injection 2                GEE Padilla MD



 Or More                

 

 Allergy  //  Administered  Injection          Christopher



 Injection 2                GEE Padilla MD



 Or More                

 

 Allergy  /  Administered  Injection          Christopher



 Injection 2                GEE Padilla MD



 Or More                

 

 Allergy  /  Administered  Injection          Christopher



 Injection 2                GEE Padilla MD



 Or More                

 

 Allergy  10/25/  Administered  Injection          Christopher



 Injection 2                GEE Padilla MD



 Or More                

 

 Allergy  10/13/  Administered  Injection          Christopher



 Injection 2                GEE Padilla MD



 Or More                

 

 Allergy  /  Administered  Injection          Christopher



 Injection 2                GEE Padilla MD



 Or More                

 

 Allergy  //  Administered  Injection          Christopher



 Injection 2                GEE Padilla MD



 Or More                

 

 Allergy  /  Administered  Injection          Christopher



 Injection 2                GEE Padilla MD



 Or More                

 

 Allergy  //  Administered  Injection          Christopher



 Injection 2                GEE Padilla MD



 Or More                

 

 Allergy  08//  Administered  Injection          Christopher



 Injection 2                GEE Padilla MD



 Or More                

 

 Allergy  /  Administered  Injection          Christopher



 Injection 2                GEE Padilla MD



 Or More                

 

 Allergy  //  Administered  Injection          Christopher



 Injection 2                GEE Padilla MD



 Or More                

 

 Allergy  /  Administered  Injection          Christopher



 Injection 2                GEE Padilla MD



 Or More                

 

 Allergy  //  Administered  Injection          Christopher



 Injection 2                GEE Padilla MD



 Or More                

 

 Allergy  //  Administered  Injection          Christopher



 Injection 2                GEE Padilla MD



 Or More                

 

 Allergy  /  Administered  Injection          Christopher



 Injection 2                GEE Padilla MD



 Or More                

 

 Allergy  04//  Administered  Injection          Christopher



 Injection 2                GEE Padilla MD



 Or More                

 

 Allergy  /  Administered  Injection          Christopher



 Injection 2                GEE Padilla MD



 Or More                

 

 Allergy  //  Administered  Injection          Christopher



 Injection 2                GEE Padilla MD



 Or More                

 

 Allergy  /  Administered  Injection          Christopher



 Injection 2                GEE Padilla MD



 Or More                

 

 Allergy  //  Administered  Injection          Christopher



 Injection 2                GEE Padilla MD



 Or More                

 

 Allergy  /  Administered  Injection          Christopher



 Injection 2                GEE Padilla MD



 Or More                

 

 Allergy  //  Administered  Injection          Christopher



 Injection 2                GEE Padilla MD



 Or More                

 

 Allergy  /  Administered  Injection          Christopher



 Injection 2                GEE Padilla MD



 Or More                

 

 Allergy  /  Administered  Injection          Christopher



 Injection 2                GEE Padilla MD



 Or More                

 

 Allergy  /  Administered  Injection          Christopher



 Injection 2                GEE Padilla MD



 Or More                

 

 Allergy  /  Administered  Injection          Christopher



 Injection 2                GEE Padilla MD



 Or More                

 

 Allergy  10/28/  Administered  Injection          Christopher



 Injection 2                GEE Padilla MD



 Or More                

 

 Allergy  10/14/  Administered  Injection          Christopher



 Injection 2                GEE Padilla MD



 Or More                

 

 Allergy  /  Administered  Injection          Christopher



 Injection 2                GEE Padilla MD



 Or More                

 

 Allergy  /  Administered  Injection          Christopher



 Injection 2                GEE Padilla MD



 Or More                

 

 Allergy  //  Administered  Injection          Christopher



 Injection 2                GEE Padilla MD



 Or More                

 

 Allergy  08//  Administered  Injection          Christopher



 Injection 2                GEE Padilla MD



 Or More                

 

 Allergy  /  Administered  Injection          Christopher



 Injection 2                GEE Padilla MD



 Or More                

 

 Allergy  07/06/  Administered  Injection          Christopher



 Injection 2                GEE Padilla MD



 Or More                

 

 Allergy  //  Administered  Injection          Christopher



 Injection 2                GEE Padilla MD



 Or More                

 

 Allergy  06//  Administered  Injection          Christopher



 Injection 2                GEE Padilla MD



 Or More                

 

 Allergy  05/25/  Administered  Injection          Christopher



 Injection 2                GEE Padilla MD



 Or More                

 

 Allergy  05/18/  Administered  Injection          Christopher



 Injection 2                GEE Padilla MD



 Or More                

 

 Allergy  /18/  Administered  Injection          Christopher



 Injection 2                GEE Padilla MD



 Or More                

 

 Allergy  //  Administered  Injection          Christopher



 Injection 2                GEE Padilla MD



 Or More                

 

 Allergy  /  Administered  Injection          Christopher



 Injection 2                GEE Padilla MD



 Or More                

 

 Allergy  /  Administered  Injection          Christopher



 Injection 2                GEE Padilla MD



 Or More                

 

 Allergy  //  Administered  Injection          Christopher



 Injection 2                GEE Padilla MD



 Or More                

 

 Allergy  04/06/  Administered  Injection          Christopher



 Injection 2                GEE Padilla MD



 Or More                

 

 Allergy  /  Administered  Injection          Christopher



 Injection 2                GEE Padilla MD



 Or More                

 

 Allergy  /  Administered  Injection          Christopher



 Injection 2                GEE Padilla MD



 Or More                

 

 Allergy  /  Administered  Injection          Christopher



 Injection 2                GEE Padilla MD



 Or More                

 

 Allergy  02/15/  Administered  Injection          Christopher



 Injection 2                GEE Padilla MD



 Or More                

 

 Allergy  /  Administered  Injection          Christopher



 Injection 2                GEE Padilla MD



 Or More                

 

 Allergy  /  Administered  Injection          Christopher



 Injection 2                GEE Padilla MD



 Or More                

 

 Allergy  /  Administered  Injection          Christopher



 Injection 2                GEE Padilla MD



 Or More                

 

 Allergy  /  Administered  Injection          Christopher



 Injection 2                GEE Padilla MD



 Or More                

 

 Allergy  /  Administered  Injection          Christopher



 Injection 2                GEE Padilla MD



 Or More                

 

 Allergy  /  Administered  Injection          Christopher



 Injection 2                GEE Padilla MD



 Or More                

 

 Allergy  /  Administered  Injection          Christopher



 Injection 2                GEE Padilla MD



 Or More                

 

 Allergy  /  Administered  Injection          Christopher



 Injection 2                GEE Padilla MD



 Or More                

 

 Allergy  /  Administered  Injection          Christopher



 Injection 2                GEE Padilla MD



 Or More                

 

 Allergy  /  Administered  Injection          Christopher



 Injection 2                GEE Padilla MD



 Or More                

 

 Allergy  /  Administered  Injection          Christopher



 Injection 2                GEE Padilla MD



 Or More                

 

 Allergy  /  Administered  Injection          Christopher



 Injection 2                GEE Padilla MD



 Or More                







Vital Signs







 Date  Vital  Result  Comment

 

 2018  BP Systolic  122 mmHg  









 BP Diastolic  64 mmHg  

 

 Height  61 inches  5'1"

 

 Weight  177.00 lb  

 

 BMI (Body Mass Index)  33.4 kg/m2  

 

 Heart Rate  68 /min  

 

 Respiratory Rate  18 /min  

 

 Body Temperature  97.6 F  

 

 O2 % BldC Oximetry  96 %  









 2017  BP Systolic  132 mmHg  









 BP Diastolic  76 mmHg  

 

 Height  61 inches  5'1"

 

 Weight  190.00 lb  

 

 BMI (Body Mass Index)  35.9 kg/m2  

 

 Heart Rate  83 /min  

 

 Respiratory Rate  16 /min  

 

 Body Temperature  94.5 F  taken x 2

 

 O2 % BldC Oximetry  98 %  









 06/10/2015  BP Systolic  117 mmHg  









 BP Diastolic  77 mmHg  

 

 Height  61 inches  

 

 Weight  175.00 lb  

 

 Heart Rate  82 /min  







Results







 Description

 

 No Information







Procedures







 Date  CPT Code  Description  Status

 

 2018  04799  Allergy Injection 2 Or More  Completed

 

 2018  61543  Allergy Antigens Single Or Multiple  Completed

 

 2018  34592  Allergy Injection Single  Completed

 

 2018  20303  Allergy Injection 2 Or More  Completed

 

 06/15/2018  78302  Allergy Injection 2 Or More  Completed

 

 2018  16333  Allergy Injection 2 Or More  Completed

 

 2018  41162  Allergy Injection 2 Or More  Completed

 

 2018  33204  Allergy Injection 2 Or More  Completed

 

 2018  33316  Allergy Injection 2 Or More  Completed

 

 2018  83523  Allergy Injection 2 Or More  Completed

 

 2018  90088  Allergy Injection 2 Or More  Completed

 

 2018  86456  Allergy Injection 2 Or More  Completed

 

 2018  13376  Allergy Injection 2 Or More  Completed

 

 2017  81265  Allergy Injection 2 Or More  Completed

 

 2017  01787  Allergy Antigens Single Or Multiple  Completed

 

 2017  88348  Allergy Injection 2 Or More  Completed

 

 10/25/2017  40163  Allergy Injection 2 Or More  Completed

 

 10/13/2017  72858  Allergy Injection 2 Or More  Completed

 

 2017  93121  Allergy Injection 2 Or More  Completed

 

 2017  17968  Allergy Injection 2 Or More  Completed

 

 2017  65186  Allergy Injection 2 Or More  Completed

 

 2017  32771  Allergy Injection 2 Or More  Completed

 

 2017  32209  Allergy Injection 2 Or More  Completed

 

 2017  98172  Allergy Injection 2 Or More  Completed

 

 2017  41860  Allergy Injection 2 Or More  Completed

 

 2017  42097  Allergy Antigens Single Or Multiple  Completed

 

 2017  36448  Allergy Injection 2 Or More  Completed

 

 2017  86368  Allergy Injection 2 Or More  Completed

 

 2017  15412  Allergy Injection 2 Or More  Completed

 

 2017  91649  Allergy Injection 2 Or More  Completed

 

 2017  35232  Allergy Injection 2 Or More  Completed

 

 2017  88448  Allergy Injection 2 Or More  Completed

 

 2017  34892  Allergy Injection 2 Or More  Completed

 

 2017  20307  Nitric Oxide  Gas Determination  Completed

 

 2017  00792  Bronchodilation Responsiveness Spirometry Pre/Post  
Completed



     Bronchodil Adm  

 

 2017  84531  Allergy Injection 2 Or More  Completed

 

 2017  57382  Allergy Injection 2 Or More  Completed

 

 2017  25036  Allergy Injection 2 Or More  Completed

 

 2017  24796  Allergy Injection 2 Or More  Completed

 

 2016  00592  Allergy Antigens Single Or Multiple  Completed

 

 2016  81434  Allergy Injection 2 Or More  Completed

 

 2016  78904  Allergy Injection 2 Or More  Completed

 

 2016  09320  Allergy Injection 2 Or More  Completed

 

 2016  27367  Allergy Injection 2 Or More  Completed

 

 10/28/2016  23185  Allergy Injection 2 Or More  Completed

 

 10/14/2016  48450  Allergy Injection 2 Or More  Completed

 

 2016  98565  Allergy Injection 2 Or More  Completed

 

 2016  38830  Allergy Injection 2 Or More  Completed

 

 2016  59318  Allergy Injection 2 Or More  Completed

 

 2016  02702  Allergy Injection 2 Or More  Completed

 

 2016  03584  Allergy Injection 2 Or More  Completed

 

 2016  27580  Allergy Injection 2 Or More  Completed

 

 2016  21322  Allergy Antigens Single Or Multiple  Completed

 

 2016  31024  Allergy Injection 2 Or More  Completed

 

 2016  17539  Allergy Injection 2 Or More  Completed

 

 2016  34608  Allergy Injection 2 Or More  Completed

 

 2016  89480  Allergy Injection 2 Or More  Completed

 

 2016  16503  Allergy Injection 2 Or More  Completed

 

 2016  75847  Allergy Injection 2 Or More  Completed

 

 2016  97422  Allergy Injection 2 Or More  Completed

 

 2016  50620  Allergy Injection 2 Or More  Completed

 

 2016  26019  Allergy Injection 2 Or More  Completed

 

 2016  10737  Allergy Injection 2 Or More  Completed

 

 2016  24838  Allergy Injection 2 Or More  Completed

 

 2016  18572  Allergy Injection 2 Or More  Completed

 

 2016  00841  Allergy Injection 2 Or More  Completed

 

 02/15/2016  70669  Allergy Injection 2 Or More  Completed

 

 2016  11957  Allergy Injection 2 Or More  Completed

 

 2016  70992  Allergy Injection 2 Or More  Completed

 

 2016  69112  Allergy Injection 2 Or More  Completed

 

 2016  34344  Allergy Injection 2 Or More  Completed

 

 2015  57537  Allergy Injection 2 Or More  Completed

 

 2015  97704  Allergy Injection 2 Or More  Completed

 

 2015  12435  Allergy Injection 2 Or More  Completed

 

 2015  10445  Allergy Injection 2 Or More  Completed

 

 2015  47110  Allergy Injection 2 Or More  Completed

 

 2015  82835  Allergy Injection 2 Or More  Completed

 

 2015  10451  Allergy Injection 2 Or More  Completed

 

 2015  91791  Allergy Injection 2 Or More  Completed







Encounters







 Type  Date  Location  Provider  CPT E/M  Dx

 

 Office Visit  2017  9:45a  [place]  Manuel Padilla MD  [id number]  J30.1









 J30.89

 

 J45.40







Plan of Care

2017 - Manuel Padilla MDJ30.1 Allergic rhinitis due to pollenNew 
Medication:Dulera 200-5 mcg/ActJ30.89 Other allergic zhudikhlJ92.40 Moderate 
persistent asthma, uncomplicated

## 2018-08-09 NOTE — RAD
Indication: Right shoulder pain.



5 views of the right shoulder demonstrates no fracture. No other bone or joint abnormality

is identified. Lung apices are unremarkable.



IMPRESSION: No fracture of the right shoulder is noted.

## 2018-08-09 NOTE — UC
Upper Extremity HPI





- HPI Summary


HPI Summary: 





This is palma Silver documenting for attending Vamsi Faith MD.





This patient is a 52 year old F presenting to [facility] with a chief complaint of 

right shoulder pain that radiates down her arm into the pointer finger. Pt 

states she was fine yesterday and it was her day off but at 1700 that night the 

pain began. The patient rates the pain 10/10 in severity and describes it as 

feeling deep in her shoulder. Symptoms aggravated by movement. Patient denies 

neck pain and elbow pain. Pt states she cannot move it well and that the pain 

is constant. She went to work today but had to leave early. 








- History of Current Complaint


Chief Complaint: UCUpperExtremity


Stated Complaint: SHOULDER INJURY


Time Seen by Provider: 08/09/18 09:07


Hx Obtained From: Patient


Hx Last Menstrual Period: menopause


Onset/Duration: Lasting Days - 1, Still Present


Severity Initially: Severe


Severity Currently: Severe


Pain Intensity: 10


Pain Scale Used: 0-10 Numeric


Location Of Pain: Is Discrete @ - right shoulder


Aggravating Factor(s): Movement


Associated Signs And Symptoms: Positive: Negative - neck pain, elbow pain,





- Allergies/Home Medications


Allergies/Adverse Reactions: 


 Allergies











Allergy/AdvReac Type Severity Reaction Status Date / Time


 


clams Allergy  Hives Verified 08/09/18 08:51


 


mold Allergy  Anaphylatic Verified 08/09/18 08:51





   Shock  


 


Penicillins Allergy  Shortness Verified 08/09/18 08:51





   of Breath  


 


NSAIDS (Non-Steroidal AdvReac  Nausea And Verified 08/09/18 08:51





Anti-Inflamma   Vomiting  


 


ENVIRONMENTAL Allergy Unknown Unknown Uncoded 08/09/18 08:51





   Reaction  





   Details  


 


MICE Allergy Unknown Unknown Uncoded 08/09/18 08:51





   Reaction  





   Details  


 


CHIPOTLE AdvReac Severe "SICK", Uncoded 08/09/18 08:51





   NAUSEA  














PMH/Surg Hx/FS Hx/Imm Hx


Cardiovascular History: Other


Other Cardiovascular History: murmur 


Respiratory History: Asthma


Neurological History: Other


Other Neurological History: arthritis





- Surgical History


Surgical History: Yes


Surgery Procedure, Year, and Place: tubal ligation.  cholecystectomy





- Family History


Known Family History: Positive: Hypertension, Diabetes, Other - cervical cancer





- Social History


Occupation: Employed Full-time


Lives: With Family


Alcohol Use: None


Substance Use Type: None


Smoking Status (MU): Heavy Every Day Tobacco Smoker


Type: Cigarettes


Amount Used/How Often: 7 cigs daily


Length of Time of Smoking/Using Tobacco: 20+ years


Have You Smoked in the Last Year: Yes





Review of Systems


Constitutional: Negative - fever


Musculoskeletal: Other: - right shoulder pain


All Other Systems Reviewed And Are Negative: Yes





Physical Exam





- Summary


Physical Exam Summary: 








General: well-appearing, no pain distress


Skin: warm, color reflects adequate perfusion, dry


Head: normal


Eyes: EOMI, IGGY


ENT: normal


Neck: supple, nontender


Respiratory: CTA, breath sounds present


Cardiovascular: RRR


Abdomen: soft, nontender


Bowel: present


Musculoskeletal: TTP in the right shoulder


Neurological: sensory/motor intact, A&O x3


Psychological: affect/mood appropriate





Triage Information Reviewed: Yes


Vital Signs: 


 Initial Vital Signs











Temp  98 F   08/09/18 08:47


 


Pulse  84   08/09/18 08:47


 


Resp  16   08/09/18 08:47


 


BP  111/68   08/09/18 08:47


 


Pulse Ox  99   08/09/18 08:47











Vital Signs Reviewed: Yes





Diagnostics





- Radiology


  ** Shoulder Xray


Radiology Interpretation Completed By: Radiologist - No fracture of the right 

shoulder is noted. Dr. Faith has reviewed this report.





Re-Evaluation





- Re-Evaluation


  ** First Eval


Re-Evaluation Time: 10:13


Change: Unchanged


Comment: I discussed the test results with the patient.





Upper Extremity Course/Dx





- Course


Course Of Treatment: PROBABLE ROTATOR CUFF INFLAMMATION.  DISCUSSED ROM 

EXERCISES.  F/U PMD; RECHECK SOONER IF WORSE.





- Differential Dx/Diagnosis


Provider Diagnoses: RIGHT SHOULDER PAIN





Discharge





- Sign-Out/Discharge


Documenting (check all that apply): Patient Departure





- Discharge Plan


Condition: Stable


Disposition: HOME


Prescriptions: 


Ibuprofen TAB* [Motrin TAB* 600 MG] 600 mg PO Q6H PRN #30 tab


 PRN Reason: Pain


methylPREDNISolone [Medrol Dosepak 4 MG*] 4 mg PO .SEE JOHN INSTRUCTION #1 john


Patient Education Materials:  Shoulder Pain (ED)


Forms:  *Work Release


Referrals: 


Leela Lang MD [Primary Care Provider] - 


Additional Instructions: 


FOLLOW UP WITH YOUR PRIMARY CARE DOCTOR. 


GET RECHECKED FOR ANY WORSENING OF YOUR CONDITION; PAIN, WEAKNESS, NUMBNESS OR 

QUESTIONS OR CONCERNS.





- Billing Disposition and Condition


Condition: STABLE


Disposition: Home





Attestation Statement


Scribe Attestation: 





 This is palma Silver documenting for attending Vamsi Faith MD.


User Type: Provider with Scribe


Provider Attestation: The documentation recorded by the scribe accurately 

reflects the service I personally performed and the decisions made by me.

## 2018-09-17 ENCOUNTER — HOSPITAL ENCOUNTER (EMERGENCY)
Dept: HOSPITAL 25 - UCEAST | Age: 53
Discharge: HOME | End: 2018-09-17
Payer: MEDICARE

## 2018-09-17 VITALS — DIASTOLIC BLOOD PRESSURE: 72 MMHG | SYSTOLIC BLOOD PRESSURE: 125 MMHG

## 2018-09-17 PROCEDURE — 99212 OFFICE O/P EST SF 10 MIN: CPT

## 2018-09-17 PROCEDURE — G0463 HOSPITAL OUTPT CLINIC VISIT: HCPCS

## 2018-09-17 NOTE — UC
Throat Pain/Nasal Jame HPI





- HPI Summary


HPI Summary: 








53 yo female presents with fever, sinus pain/pressure/congestion, sore throat, 

and chest congestion for the last 4-5 days getting progressively worse. She has 

been taking zyrtec OTC with no relief. Does have a hx of COPD and uses daily 

inhalers. She is still smoking. Denies chills, SOB, chest pain, abdominal pain, 

n/v.








- History of Current Complaint


Chief Complaint: UCRespiratory


Stated Complaint: CONGESTED


Time Seen by Provider: 09/17/18 18:05


Hx Last Menstrual Period: 


Onset/Duration: Gradual Onset


Severity: Moderate


Pain Intensity: 6


Pain Scale Used: 0-10 Numeric


Cough: Nonproductive





- Allergies/Home Medications


Allergies/Adverse Reactions: 


 Allergies











Allergy/AdvReac Type Severity Reaction Status Date / Time


 


clams Allergy  Hives Verified 09/17/18 17:33


 


mold Allergy  Anaphylatic Verified 09/17/18 17:33





   Shock  


 


Penicillins Allergy  Shortness Verified 09/17/18 17:33





   of Breath  


 


NSAIDS (Non-Steroidal AdvReac  Nausea And Verified 09/17/18 17:33





Anti-Inflamma   Vomiting  


 


ENVIRONMENTAL Allergy Unknown Unknown Uncoded 09/17/18 17:33





   Reaction  





   Details  


 


MICE Allergy Unknown Unknown Uncoded 09/17/18 17:33





   Reaction  





   Details  


 


CHIPOTLE AdvReac Severe "SICK", Uncoded 09/17/18 17:33





   NAUSEA  











Home Medications: 


 Home Medications





Fluticasone-Salmeterol 500-50* [Advair Diskus 500-50*] 1 puff INH BID 09/17/18 [

History Confirmed 09/17/18]


Ibuprofen TAB* [Motrin TAB* 600 MG] 200 mg PO Q6H PRN 09/17/18 [History 

Confirmed 09/17/18]


methylPREDNISolone [Medrol Dosepak 4 MG*] 8 mg PO .SEE JOHN INSTRUCTION PRN 09/17 /18 [History Confirmed 09/17/18]











PMH/Surg Hx/FS Hx/Imm Hx


Respiratory History: COPD, Asthma





- Surgical History


Surgical History: Yes


Surgery Procedure, Year, and Place: tubal ligation.  cholecystectomy





- Family History


Known Family History: Positive: Hypertension, Diabetes, Other - cervical cancer





- Social History


Occupation: Employed Full-time


Lives: With Family


Alcohol Use: None


Substance Use Type: None


Smoking Status (MU): Light Every Day Tobacco Smoker


Type: Cigarettes


Amount Used/How Often: 7 cigs daily


Length of Time of Smoking/Using Tobacco: 20+ years


Have You Smoked in the Last Year: Yes





Review of Systems


Constitutional: Fever


Skin: Negative


Eyes: Negative


ENT: Nasal Discharge, Sinus Congestion, Sinus Pain/Tenderness


Respiratory: Cough


Cardiovascular: Negative


Gastrointestinal: Negative


Neurovascular: Negative


Neurological: Negative


Psychological: Negative


All Other Systems Reviewed And Are Negative: Yes





Physical Exam





- Summary


Physical Exam Summary: 





GENERAL: NAD. WDWN. No pain distress.


SKIN: No rashes, sores, lesions, or open wounds.


HEENT:


            Head: AT/NC


            Eyes: Conjunctiva clear without inflammation or discharge.


            Ears: Hearing grossly normal. TMs intact, no bulging, erythema, or 

edema. 


             Nose: Nasal mucosa mildly swollen and erythematous with yellow 

discharge. TTP maxillary and frontal sinus.


            Throat: Posterior oropharynx without exudates, erythema, or 

tonsillar enlargement.  Uvula midline.


NECK: Supple. Nontender. No lymphadenopathy. 


CHEST: Mild wheezing throughout. No r/r. No accessory muscle use. Breathing 

comfortably and in no distress.


CV:  RRR. Without m/r/g. Pulses intact. Cap refill <2seconds


NEURO: Alert. 


PSYCH: Age appropriate behavior.


Triage Information Reviewed: Yes


Vital Signs: 


 Initial Vital Signs











Temp  97.4 F   09/17/18 17:28


 


Pulse  90   09/17/18 17:28


 


Resp  18   09/17/18 17:28


 


BP  125/72   09/17/18 17:28


 


Pulse Ox  98   09/17/18 17:28











Vital Signs Reviewed: Yes





Throat Pain/Nasal Course/Dx





- Course


Course Of Treatment: Sinusitis.  Cough





- Differential Dx/Diagnosis


Provider Diagnoses: Sinusitis.  Cough





Discharge





- Sign-Out/Discharge


Documenting (check all that apply): Patient Departure


All imaging exams completed and their final reports reviewed: No Studies





- Discharge Plan


Condition: Stable


Disposition: HOME


Prescriptions: 


Azithromycin TAB* [Zithromax TAB (Z-JOHN) 250 mg #6 tabs] 2 tab PO .TODAY, THEN 

1 DAILY #1 john


Benzonatate CAP* [Tessalon 100 MG CAP*] 100 mg PO TID PRN #15 cap


 PRN Reason: Cough


Patient Education Materials:  Sinusitis (ED)


Forms:  *Work Release


Referrals: 


Leela Lang MD [Primary Care Provider] - 


Additional Instructions: 


If you develop a fever, shortness of breath, chest pain, new or worsening 

symptoms - please call your PCP or go to the ED.


 








- Billing Disposition and Condition


Condition: STABLE


Disposition: Home





- Attestation Statements


Provider Attestation: 





I was available for consult. This patient was seen by the DWAYNE. The patient was 

not presented to, seen by, or examined by me. -Tess

## 2021-10-28 NOTE — ED
Back Pain





- HPI Summary


HPI Summary: 


52-year-old female presents with right-sided lower back pain today.  She has 

history of osteoporosis and told not to lift her leg.  She forgot and stepped 

out of the shower and lifted her leg.  She states she felt a pop in her lower 

back.  She states it started her sciatic pain down her right leg.  She denies 

any saddle anaesthesia or loss of bowel or bladder.  She states the tingling 

starts in the SI joint and travels down her right leg.  She has not taken 

anything for her pain.  She generally takes Motrin for her pain.  She has 

history of chronic back pain.  She denies any fevers.  She denies any other 

injury.  She denies any chest pain or shortness of breath.








- History of Current Complaint


Chief Complaint: EDFlankPain


Stated Complaint: RT SIDE FLANK PAIN


Time Seen by Provider: 02/11/18 20:58


Hx Last Menstrual Period: menapause


Pain Intensity: 8





- Allergies/Home Medications


Allergies/Adverse Reactions: 


 Allergies











Allergy/AdvReac Type Severity Reaction Status Date / Time


 


clams Allergy  Hives Verified 02/11/18 20:48


 


mold Allergy  Anaphylatic Verified 02/11/18 20:48





   Shock  


 


Penicillins Allergy  Shortness Verified 02/11/18 20:48





   of Breath  


 


NSAIDS (Non-Steroidal AdvReac  Nausea And Verified 02/11/18 22:01





Anti-Inflamma   Vomiting  


 


ENVIRONMENTAL Allergy Unknown Unknown Uncoded 02/11/18 20:48





   Reaction  





   Details  


 


MICE Allergy Unknown Unknown Uncoded 02/11/18 20:48





   Reaction  





   Details  


 


CHIPOTLE AdvReac Severe "SICK", Uncoded 02/11/18 22:01





   NAUSEA  














PMH/Surg Hx/FS Hx/Imm Hx


Endocrine/Hematology History: 


   Denies: Hx Diabetes, Hx Thyroid Disease


Cardiovascular History: 


   Denies: Hx Hypertension


Respiratory History: Reports: Hx Asthma - uses nebulizer


   Denies: Hx Chronic Obstructive Pulmonary Disease (COPD) - never been 

diagnosed


GI History: 


   Denies: Hx Ulcer


Musculoskeletal History: Reports: Hx Osteoporosis





- Cancer History


Cancer Type, Location and Year: denies


Hx Chemotherapy: No


Hx Radiation Therapy: No





- Surgical History


Surgery Procedure, Year, and Place: tubal ligation.  cholecystectomy





- Immunization History


Date of Tetanus Vaccine: unknown


Date of Influenza Vaccine: UTD


Immunizations Up to Date: Yes


Infectious Disease History: No


Infectious Disease History: 


   Denies: Hx Clostridium Difficile, Hx Hepatitis, Hx Human Immunodeficiency 

Virus (HIV), Hx of Known/Suspected MRSA, Hx Shingles, Hx Tuberculosis, Hx Known/

Suspected VRE, Hx Known/Suspected VRSA, History Other Infectious Disease, 

Traveled Outside the [place] in Last 30 Days





- Family History


Known Family History: Positive: Hypertension, Diabetes, Other - cervical cancer





- Social History


Alcohol Use: None


Hx Substance Use: No


Substance Use Type: Reports: None


Hx Tobacco Use: Yes


Smoking Status (MU): Heavy Every Day Tobacco Smoker


Type: Cigarettes


Amount Used/How Often: 10-12 cigs daily


Length of Time of Smoking/Using Tobacco: 20+ years


Have You Smoked in the Last Year: Yes





Review of Systems


Negative: Fever


Negative: Chest Pain


Negative: Shortness Of Breath


Positive: Myalgia - back and right leg pain


All Other Systems Reviewed And Are Negative: Yes





Physical Exam


Triage Information Reviewed: Yes


Vital Signs On Initial Exam: 


 Initial Vitals











Temp Pulse Resp BP Pulse Ox


 


 98.0 F   74   18   115/67   96 


 


 02/11/18 20:42  02/11/18 20:42  02/11/18 20:42  02/11/18 20:42  02/11/18 20:42











Vital Signs Reviewed: Yes


Appearance: Positive: Well-Appearing


Skin: Positive: Warm, Dry


Head/Face: Positive: Normal Head/Face Inspection


Eyes: Positive: Normal, Conjunctiva Clear


Respiratory/Lung Sounds: Positive: Clear to Auscultation, Breath Sounds Present


Cardiovascular: Positive: Normal, RRR


Musculoskeletal: Positive: Limited @, Other - tenderness over SI joint back, 

sensation grossly intact, pos SLR right, good pulses


Neurological: Positive: Sensory/Motor Intact


Psychiatric: Positive: Normal





Diagnostics





- Vital Signs


 Vital Signs











  Temp Pulse Resp BP Pulse Ox


 


 02/11/18 20:42  98.0 F  74  18  115/67  96














- Laboratory


Lab Statement: Any lab studies that have been ordered have been reviewed, and 

results considered in the medical decision making process.





- Radiology


  ** lumbar


Xray Interpretation: No Acute Changes


Radiology Interpretation Completed By: Radiologist





Back Pain Course/Dx





- Course


Assessment/Plan: 52-year-old female presents with right-sided lower back pain 

today.  She has history of osteoporosis and told not to lift her leg.  She 

forgot and stepped out of the shower and lifted her leg.  She states she felt a 

pop in her lower back.  She states it started her sciatic pain down her right 

leg.  She denies any saddle anaesthesia or loss of bowel or bladder.  She 

states the tingling starts in the SI joint and travels down her right leg.  She 

has not taken anything for her pain.  She generally takes Motrin for her pain.  

She has history of chronic back pain.  She denies any fevers.  She denies any 

other injury.  She denies any chest pain or shortness of breath.  on exam 

tenderness over right side of lower back, pos SLR, tenderness over SI joint 

right, sensation grossly intact. CT spine no fracture. will treat with 

lidocaine patch and medrol. patient understand and agrees with plan.





- Diagnoses


Differential Diagnosis/HQI/PQRI: Positive: Fracture, Herniated Disc, Strain, 

Sprain


Provider Diagnoses: 


 Back pain








Discharge





- Discharge Plan


Condition: Good


Disposition: HOME


Prescriptions: 


Lidocaine PATCH 5%* [Lidoderm 5% Patch*] 1 patch TRANSDERM DAILY #5 patch


methylPREDNISolone [Medrol Dosepak 4 MG*] 4 mg PO .SEE JOHN INSTRUCTION #1 packet


Patient Education Materials:  Back Pain (ED)


Referrals: 


Leela Lang MD [Primary Care Provider] - 


Additional Instructions: 


Follow directions on package for Medrol pack


Apply lidocaine patches to area for up to 12 hours in one 24 hour period


Use ibuprofen or Tylenol for pain every 6 hours


ice/heat area, move as much as possible 


Follow up with primary within 5 days


Return to ED if develop any new or worsening symptoms Use Therapeutic Ranged Or Therapeutic Target: please select Range or Target